# Patient Record
Sex: MALE | Race: AMERICAN INDIAN OR ALASKA NATIVE | NOT HISPANIC OR LATINO
[De-identification: names, ages, dates, MRNs, and addresses within clinical notes are randomized per-mention and may not be internally consistent; named-entity substitution may affect disease eponyms.]

---

## 2017-02-28 ENCOUNTER — APPOINTMENT (OUTPATIENT)
Dept: OBGYN | Facility: CLINIC | Age: 47
End: 2017-02-28

## 2017-02-28 DIAGNOSIS — Z13.79 ENCOUNTER FOR OTHER SCREENING FOR GENETIC AND CHROMOSOMAL ANOMALIES: ICD-10-CM

## 2017-02-28 PROBLEM — Z00.00 ENCOUNTER FOR PREVENTIVE HEALTH EXAMINATION: Status: ACTIVE | Noted: 2017-02-28

## 2017-03-10 LAB — CFTR MUT TESTED BLD/T: NORMAL

## 2018-10-03 ENCOUNTER — EMERGENCY (EMERGENCY)
Facility: HOSPITAL | Age: 48
LOS: 1 days | Discharge: HOME | End: 2018-10-03
Attending: EMERGENCY MEDICINE | Admitting: EMERGENCY MEDICINE
Payer: COMMERCIAL

## 2018-10-03 VITALS
SYSTOLIC BLOOD PRESSURE: 149 MMHG | RESPIRATION RATE: 16 BRPM | HEART RATE: 65 BPM | OXYGEN SATURATION: 99 % | TEMPERATURE: 97 F | DIASTOLIC BLOOD PRESSURE: 86 MMHG

## 2018-10-03 PROCEDURE — 99282 EMERGENCY DEPT VISIT SF MDM: CPT

## 2018-10-03 NOTE — ED PROVIDER NOTE - OBJECTIVE STATEMENT
48y M with PMHx HTN and DM presents to the ED for back pain x2 days. Reports pain radiates down left knee. No heavy lifting or trauma. Has hx of back pain in the past. 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 No numbness, tingling, or fever. No trouble urinating or defecating

## 2018-10-03 NOTE — ED PROVIDER NOTE - MEDICAL DECISION MAKING DETAILS
Chronic back pain likely due to prolonged sitting due to occupation. Subtherapeutic treatment Chronic back pain likely due to prolonged sitting due to occupation. Subtherapeutic treatment. no red flags

## 2018-10-03 NOTE — ED PROVIDER NOTE - PROGRESS NOTE DETAILS
tylenol 650mg every 4 hrs and /or motrin 600mg every 6 hrs, heat packs, no heavy lifting, physical therapy, massage, lumbar support

## 2018-10-03 NOTE — ED ADULT TRIAGE NOTE - CHIEF COMPLAINT QUOTE
Pt c/o lower back pain radiating to left leg x2 days, worsening today.  Pt took Advil with no relief x1 hour ago.